# Patient Record
Sex: FEMALE
[De-identification: names, ages, dates, MRNs, and addresses within clinical notes are randomized per-mention and may not be internally consistent; named-entity substitution may affect disease eponyms.]

---

## 2017-07-11 ENCOUNTER — TELEPHONE (OUTPATIENT)
Dept: OTHER | Facility: CLINIC | Age: 57
End: 2017-07-11

## 2018-06-28 ENCOUNTER — TELEPHONE (OUTPATIENT)
Dept: PHARMACY | Facility: CLINIC | Age: 58
End: 2018-06-28

## 2019-06-05 ENCOUNTER — TELEPHONE (OUTPATIENT)
Dept: PHARMACY | Facility: CLINIC | Age: 59
End: 2019-06-05

## 2019-06-05 NOTE — LETTER
Using a Statin for Your Patient with Diabetes    Date: 19    Dear Maribell Simpson,  Fax: [phone]     Concerning patient: Galindo Calderón    :  1960    Should the patient be taking atorvastatin? It appears, per insurance claim history, the patient has not filled atorvastatin in 2019. Noted that patient has elevated LFT's but unsure if the patient is still prescribed this medication. Please respond and I would be happy to outreach to the patient regarding medication adherence. For your review:   The 2013 American College of Cardiology/American Heart Association guidelines recommend that the benefits of using a statin outweigh the risk for diabetes patients age 36 to 76. The American Diabetes Association Standards of Medical Care also suggest a statin for most diabetes patients age 36 to 76. These recommendations are based on primary and secondary prevention benefits of statins on cardiovascular events and mortality in this patient population. A moderate-intensity statin (e.g., atorvastatin 20 mg) is recommended for patients in this age group without additional cardiovascular risk factors. Patients with cardiovascular disease or additional risk factors should receive a high-intensity statin (e.g., atorvastatin 40 mg or 80 mg). It appears the patient is prescribed atorvastatin 20 mg daily, but patient has not filled this medication in 2019 per her insurance. Please follow up with the patient for any medication changes as appropriate. Please also feel free to contact me at the number below with any questions or concerns, or if you would like me to further discuss with your patient. Thank you for your help and consideration in caring for this patient.   Respectfully,      Rae Brewster, PharmD  [address]  Phone: [phone], option 7  Fax: [phone]

## 2019-06-05 NOTE — TELEPHONE ENCOUNTER
CLINICAL PHARMACY: STATIN REVIEW    SUBJECTIVE:   Identified as DM care gap for [employer]: statin therapy. OBJECTIVE:  Allergies not on file    Medications per current medication list:  No current outpatient medications on file. No current facility-administered medications for this visit. Labs:  No results found for: CHOL  No results found for: TRIG  No results found for: HDL  No results found for: LDLCALC, LDLCHOLESTEROL  No results found for: LABVLDL, VLDL  No results found for: CHOLHDLRATIO  No results found for: ALT     The ASCVD Risk score (Rosalind Abdullahi et al., 2013) failed to calculate for the following reasons: The systolic blood pressure is missing    Cannot find a previous HDL lab    Cannot find a previous total cholesterol lab    The smoking status is invalid    Unable to determine if patient is Non-       ASSESSMENT:  Hyperlipidemia Goal: Patient is not currently prescribed any-intensity statin therapy. 2019 ADA Guidelines Age:   Vijay Flax  >/= 36years old:   o No history of ASCVD or 10-year ASCVD risk < 20% - moderate-intensity statin is recommended.   o History of ASCVD or 10-year ASCVD risk > 20% - high-intensity statin is recommended. PLAN:  Per Care Everywhere, the patient is prescribed atorvastatin 20 mg daily, but there is mention in the patient's chart that her LFT's are elevated. 3/20/19: AST = 56 ; ALT = 72 per Care Everywhere. Unsure if patient is still prescribed atorvastatin. Per chart review, it appears the patient is non-adherent to medication regimen. Will prepare and send fax to PCP to see if the patient is supposed to be taking atorvastatin and will f/u with patient if a response is received.      Thank you,    Natasha Lay, PharmD, [address]  Direct: [phone]  Department, toll free [phone], option 7        For Pharmacy Admin Tracking Only    PHSO: Yes  Total # of Interventions Recommended: 1  - New Order #: 1 New Medication Order Reason(s): Needs Additional Medication Therapy  - Maintenance Safety Lab Monitoring #: 1  Recommended intervention potential cost savings: 0  Total Interventions Accepted: 0  Time Spent (min): 20